# Patient Record
Sex: FEMALE | URBAN - METROPOLITAN AREA
[De-identification: names, ages, dates, MRNs, and addresses within clinical notes are randomized per-mention and may not be internally consistent; named-entity substitution may affect disease eponyms.]

---

## 2024-02-04 ENCOUNTER — LAB (OUTPATIENT)
Dept: URBAN - METROPOLITAN AREA MEDICAL CENTER 19 | Facility: MEDICAL CENTER | Age: 24
End: 2024-02-04
Payer: COMMERCIAL

## 2024-02-04 DIAGNOSIS — R93.3 ABNORMAL FINDINGS ON DIAGNOSTIC IMAGING OF OTHER PARTS OF DIGESTIVE TRACT: ICD-10-CM

## 2024-02-04 DIAGNOSIS — R11.2 NAUSEA AND VOMITING, UNSPECIFIED VOMITING TYPE: ICD-10-CM

## 2024-02-04 DIAGNOSIS — R10.84 GENERALIZED ABDOMINAL PAIN: ICD-10-CM

## 2024-02-04 DIAGNOSIS — D69.0 ALLERGIC PURPURA: ICD-10-CM

## 2024-02-04 PROCEDURE — 99222 1ST HOSP IP/OBS MODERATE 55: CPT | Performed by: INTERNAL MEDICINE

## 2024-02-05 ENCOUNTER — LAB (OUTPATIENT)
Dept: URBAN - METROPOLITAN AREA MEDICAL CENTER 19 | Facility: MEDICAL CENTER | Age: 24
End: 2024-02-05
Payer: COMMERCIAL

## 2024-02-05 DIAGNOSIS — R19.7 DIARRHEA, UNSPECIFIED TYPE: ICD-10-CM

## 2024-02-05 DIAGNOSIS — R93.3 ABNORMAL FINDINGS ON DIAGNOSTIC IMAGING OF OTHER PARTS OF DIGESTIVE TRACT: ICD-10-CM

## 2024-02-05 DIAGNOSIS — D69.0 ALLERGIC PURPURA: ICD-10-CM

## 2024-02-05 DIAGNOSIS — R11.2 NAUSEA AND VOMITING, UNSPECIFIED VOMITING TYPE: ICD-10-CM

## 2024-02-05 DIAGNOSIS — R10.84 GENERALIZED ABDOMINAL PAIN: ICD-10-CM

## 2024-02-05 PROCEDURE — 99232 SBSQ HOSP IP/OBS MODERATE 35: CPT | Performed by: INTERNAL MEDICINE

## 2024-04-05 ENCOUNTER — OV NP (OUTPATIENT)
Dept: URBAN - METROPOLITAN AREA CLINIC 113 | Facility: CLINIC | Age: 24
End: 2024-04-05
Payer: COMMERCIAL

## 2024-04-05 VITALS
RESPIRATION RATE: 16 BRPM | DIASTOLIC BLOOD PRESSURE: 83 MMHG | HEART RATE: 84 BPM | WEIGHT: 119 LBS | TEMPERATURE: 98.1 F | SYSTOLIC BLOOD PRESSURE: 124 MMHG | HEIGHT: 61 IN | BODY MASS INDEX: 22.47 KG/M2

## 2024-04-05 DIAGNOSIS — K52.89 (LYMPHOCYTIC) MICROSCOPIC COLITIS: ICD-10-CM

## 2024-04-05 DIAGNOSIS — D69.0 ALLERGIC PURPURA: ICD-10-CM

## 2024-04-05 DIAGNOSIS — R14.0 BLOATING: ICD-10-CM

## 2024-04-05 PROBLEM — 387778001: Status: ACTIVE | Noted: 2024-04-05

## 2024-04-05 PROBLEM — 870349001: Status: ACTIVE | Noted: 2024-04-05

## 2024-04-05 PROBLEM — 64613007: Status: ACTIVE | Noted: 2024-04-05

## 2024-04-05 PROBLEM — 116289008: Status: ACTIVE | Noted: 2024-04-05

## 2024-04-05 PROCEDURE — 99214 OFFICE O/P EST MOD 30 MIN: CPT | Performed by: INTERNAL MEDICINE

## 2024-04-05 RX ORDER — GABAPENTIN 100 MG/1
1 CAPSULE CAPSULE ORAL ONCE A DAY
Status: ACTIVE | COMMUNITY

## 2024-04-05 RX ORDER — OMEPRAZOLE 20 MG/1
1 CAPSULE 30 MINUTES BEFORE MORNING MEAL CAPSULE, DELAYED RELEASE ORAL ONCE A DAY
Qty: 30 | Refills: 3 | OUTPATIENT
Start: 2024-04-05

## 2024-04-05 RX ORDER — DOXEPIN HYDROCHLORIDE 100 MG/1
1 CAPSULE AT BEDTIME CAPSULE ORAL ONCE A DAY
Status: ACTIVE | COMMUNITY

## 2024-04-05 NOTE — PHYSICAL EXAM GASTROINTESTINAL
Abdomen soft, mild suprapubic tenderness, no distension, no masses palpable , normal bowel sounds, no hepatosplenomegaly

## 2024-04-05 NOTE — HPI-TODAY'S VISIT:
23-year-old woman with history of PCOS and IgA vasculitis presents for posthospital discharge follow-up. . She was admitted to the hospital in early February with abdominal pain, nausea, vomiting approximately 2 weeks following sinus surgery.  Approximately 1 week postop, she developed a purpleish skin rash on her lower extremities followed by arthralgias followed by abdominal pain and diarrhea.  No blood per rectum or melena.  CT scan of the abdomen pelvis with contrast revealed wall thickening and mucosal enhancement in a long segment of terminal ileum with inflammatory changes but no obstruction.  Normal colon.  No family history of inflammatory bowel disease. . She was diagnosed with IgA vasculitis/Henoch-Schoenlein purpura.  No proteinuria or hematuria or acute kidney injury.  C3, C4, rheumatoid factor normal.  Hep B, C, and HIV were negative.  At that time, it was thought that her enteritis was related to vasculitis.  Fecal calprotectin and stool studies were recommended but I do not see that these were completed.   . Since discharge, has seen Dr. Coleman (Rheum) who has maintained her on steroids.  Currently on prednisone 15 mg per day.  when she has tapered to 10 mg, the rash comes back.  Abd symptoms are much better.  Continues to have bloating.  Occasional pain particularly when rash flares.  No vomiting.  Smaller more frequent meals help.  Has BM daily.  Loose, mucousy stools in morning.  Some weight gain which she attributes to steroids.  Continues to have joint pain.

## 2024-06-05 ENCOUNTER — DASHBOARD ENCOUNTERS (OUTPATIENT)
Age: 24
End: 2024-06-05

## 2024-06-05 ENCOUNTER — OFFICE VISIT (OUTPATIENT)
Dept: URBAN - METROPOLITAN AREA CLINIC 113 | Facility: CLINIC | Age: 24
End: 2024-06-05

## 2024-06-05 RX ORDER — GABAPENTIN 100 MG/1
1 CAPSULE CAPSULE ORAL ONCE A DAY
Status: ACTIVE | COMMUNITY

## 2024-06-05 RX ORDER — DOXEPIN HYDROCHLORIDE 100 MG/1
1 CAPSULE AT BEDTIME CAPSULE ORAL ONCE A DAY
Status: ACTIVE | COMMUNITY

## 2024-06-05 RX ORDER — OMEPRAZOLE 20 MG/1
1 CAPSULE 30 MINUTES BEFORE MORNING MEAL CAPSULE, DELAYED RELEASE ORAL ONCE A DAY
Qty: 30 | Refills: 3 | Status: ACTIVE | COMMUNITY
Start: 2024-04-05

## 2024-06-05 NOTE — HPI-TODAY'S VISIT:
23-year-old woman with history of PCOS and IgA vasculitis presents for follow-up.  My initial visit with her was in early April 2024 at which time she was seen as a posthospital discharge follow-up for enteritis thought to be related to IgA vasculitis/HSP.  At that time, she was being maintained on prednisone 15 mg daily and attempts to taper to 10 mg apparently led to recurrence of her purpuric rash.  At that time, she was having minimal GI symptoms with much improvement in her abdominal pain.  Was having bloating and occasional pain which appeared to worsen with flare of her rash.  She was adhering to small frequent meals which was controlling her nausea and preventing vomiting.  She was having daily bowel movements and some weight gain which she attributed to steroids.  Recommendations at that time were to start PPI while on steroids, low FODMAP diet, Citrucel for bowel regularity.  EGD/colonoscopy deferred at that time given improvement in symptoms.

## 2024-08-02 ENCOUNTER — TELEPHONE ENCOUNTER (OUTPATIENT)
Dept: URBAN - METROPOLITAN AREA CLINIC 113 | Facility: CLINIC | Age: 24
End: 2024-08-02

## 2024-08-26 ENCOUNTER — ERX REFILL RESPONSE (OUTPATIENT)
Dept: URBAN - METROPOLITAN AREA CLINIC 113 | Facility: CLINIC | Age: 24
End: 2024-08-26

## 2024-08-26 ENCOUNTER — WEB ENCOUNTER (OUTPATIENT)
Dept: URBAN - METROPOLITAN AREA CLINIC 113 | Facility: CLINIC | Age: 24
End: 2024-08-26

## 2024-08-26 RX ORDER — OMEPRAZOLE 20 MG/1
1 CAPSULE 30 MINUTES BEFORE MORNING MEAL CAPSULE, DELAYED RELEASE ORAL ONCE A DAY
Qty: 30 | Refills: 3 | OUTPATIENT

## 2024-08-26 RX ORDER — OMEPRAZOLE 20 MG/1
TAKE 1 CAPSULE BY MOUTH 30 MINUTES BEFORE MORNING MEAL EVERY DAY FOR 30 DAYS CAPSULE, DELAYED RELEASE ORAL
Qty: 90 CAPSULE | Refills: 1 | OUTPATIENT

## 2024-11-13 ENCOUNTER — OFFICE VISIT (OUTPATIENT)
Dept: URBAN - METROPOLITAN AREA CLINIC 113 | Facility: CLINIC | Age: 24
End: 2024-11-13
Payer: COMMERCIAL

## 2024-11-13 VITALS
RESPIRATION RATE: 16 BRPM | WEIGHT: 124.2 LBS | HEART RATE: 90 BPM | BODY MASS INDEX: 23.45 KG/M2 | DIASTOLIC BLOOD PRESSURE: 76 MMHG | SYSTOLIC BLOOD PRESSURE: 109 MMHG | HEIGHT: 61 IN | TEMPERATURE: 97.7 F

## 2024-11-13 DIAGNOSIS — K52.9 NONINFECTIVE GASTROENTERITIS AND COLITIS, UNSPECIFIED: ICD-10-CM

## 2024-11-13 DIAGNOSIS — R14.0 BLOATING: ICD-10-CM

## 2024-11-13 DIAGNOSIS — D69.0 ALLERGIC PURPURA: ICD-10-CM

## 2024-11-13 PROCEDURE — 99214 OFFICE O/P EST MOD 30 MIN: CPT | Performed by: INTERNAL MEDICINE

## 2024-11-13 RX ORDER — AZATHIOPRINE 50 MG/1
AS DIRECTED TABLET ORAL
Status: ACTIVE | COMMUNITY

## 2024-11-13 RX ORDER — OMEPRAZOLE 20 MG/1
TAKE 1 CAPSULE BY MOUTH 30 MINUTES BEFORE MORNING MEAL EVERY DAY FOR 30 DAYS CAPSULE, DELAYED RELEASE ORAL
Qty: 90 CAPSULE | Refills: 1 | Status: ACTIVE | COMMUNITY

## 2024-11-13 RX ORDER — DOXEPIN HYDROCHLORIDE 100 MG/1
1 CAPSULE AT BEDTIME CAPSULE ORAL ONCE A DAY
Status: ON HOLD | COMMUNITY

## 2024-11-13 RX ORDER — HYDROCODONE BITARTRATE AND ACETAMINOPHEN 5; 325 MG/1; MG/1
1 TABLET AS NEEDED TABLET ORAL
Status: ACTIVE | COMMUNITY

## 2024-11-13 RX ORDER — GABAPENTIN 100 MG/1
1 CAPSULE CAPSULE ORAL ONCE A DAY
Status: ON HOLD | COMMUNITY

## 2024-11-13 NOTE — PHYSICAL EXAM GASTROINTESTINAL
Abdomen soft, no tenderness, no distension, no masses palpable , normal bowel sounds, no hepatosplenomegaly

## 2024-11-13 NOTE — HPI-TODAY'S VISIT:
Lakeshia White is a 24-year-old woman with history of PCOS and IgA vasculitis presents for follow-up. She was previously followed by Dr. Clay but requested a provider stacy.  She was admitted to the hospital in early February with abdominal pain, nausea, vomiting approximately 2 weeks following sinus surgery. Approximately 1 week postop, she developed a purplish skin rash on her lower extremities followed by arthralgias followed by abdominal pain and diarrhea. No blood per rectum or melena. CT scan of the abdomen and pelvis with contrast revealed wall thickening and mucosal enhancement in a long segment of terminal ileum with inflammatory changes but no obstruction. Normal colon. No family history of inflammatory bowel disease. . She was diagnosed with IgA vasculitis/Henoch-Schoenlein purpura. No proteinuria or hematuria or acute kidney injury. C3, C4, rheumatoid factor normal. Hep B, C, and HIV were negative. At that time, it was thought that her enteritis was related to vasculitis.  Fecal calprotectin and stool studies were recommended but I do not see that these were completed.   . Since discharge, she has been seeing Dr. Darrell Coleman (Rheumatology).  Dr. Clay's initial office visit with her was in early April 2024, when she was seen as a posthospital discharge follow-up for enteritis thought to be related to IgA vasculitis/HSP.  At that time, she was being maintained on prednisone 15 mg daily and attempts to taper to 10 mg led to recurrence of her purpuric rash.  At that time, she was having minimal GI symptoms with much improvement in her abdominal pain, with bloating and occasional pain which worsened with flare of her rash.  She was adhering to small frequent meals which was controlling her nausea and preventing vomiting.  She was having daily bowel movements and some weight gain which she attributed to steroids.  Recommendations at that time were to start PPI while on steroids, low FODMAP diet, Citrucel for bowel regularity.  EGD/colonoscopy deferred at that time given improvement in symptoms.  She missed a follow up visit on 6/5/24.    The patient began taking Imuran 2 weeks ago, and she is much better today.  She was having symptoms of fevers, aches and nausea.  Her stools are still somewhat loose, and to have some mucus, but she feels better overall and seems to be improving.  She has no new complaints today. .

## 2024-11-21 ENCOUNTER — WEB ENCOUNTER (OUTPATIENT)
Dept: URBAN - METROPOLITAN AREA CLINIC 113 | Facility: CLINIC | Age: 24
End: 2024-11-21

## 2024-11-21 RX ORDER — OMEPRAZOLE 20 MG/1
TAKE 1 CAPSULE BY MOUTH 30 MINUTES BEFORE MORNING MEAL EVERY DAY FOR 30 DAYS CAPSULE, DELAYED RELEASE ORAL ONCE A DAY
Qty: 90 | Refills: 2

## 2025-01-15 ENCOUNTER — OFFICE VISIT (OUTPATIENT)
Dept: URBAN - METROPOLITAN AREA CLINIC 113 | Facility: CLINIC | Age: 25
End: 2025-01-15
Payer: COMMERCIAL

## 2025-01-15 VITALS
HEIGHT: 61 IN | WEIGHT: 118 LBS | TEMPERATURE: 97.7 F | HEART RATE: 82 BPM | DIASTOLIC BLOOD PRESSURE: 64 MMHG | SYSTOLIC BLOOD PRESSURE: 98 MMHG | BODY MASS INDEX: 22.28 KG/M2

## 2025-01-15 DIAGNOSIS — K52.9 NONINFECTIVE GASTROENTERITIS AND COLITIS, UNSPECIFIED: ICD-10-CM

## 2025-01-15 DIAGNOSIS — R14.0 BLOATING: ICD-10-CM

## 2025-01-15 DIAGNOSIS — D69.0 ALLERGIC PURPURA: ICD-10-CM

## 2025-01-15 PROCEDURE — 99213 OFFICE O/P EST LOW 20 MIN: CPT | Performed by: INTERNAL MEDICINE

## 2025-01-15 RX ORDER — TIZANIDINE 2 MG/1
1 TABLET AT BEDTIME AS NEEDED TABLET ORAL ONCE A DAY
Status: ACTIVE | COMMUNITY

## 2025-01-15 RX ORDER — OMEPRAZOLE 20 MG/1
TAKE 1 CAPSULE BY MOUTH 30 MINUTES BEFORE MORNING MEAL EVERY DAY FOR 30 DAYS CAPSULE, DELAYED RELEASE ORAL ONCE A DAY
Qty: 90 | Refills: 2 | Status: ACTIVE | COMMUNITY

## 2025-01-15 RX ORDER — GABAPENTIN 100 MG/1
1 CAPSULE CAPSULE ORAL ONCE A DAY
Status: ON HOLD | COMMUNITY

## 2025-01-15 RX ORDER — HYDROCODONE BITARTRATE AND ACETAMINOPHEN 5; 325 MG/1; MG/1
1 TABLET AS NEEDED TABLET ORAL
Status: ACTIVE | COMMUNITY

## 2025-01-15 RX ORDER — DOXEPIN HYDROCHLORIDE 100 MG/1
1 CAPSULE AT BEDTIME CAPSULE ORAL ONCE A DAY
Status: ON HOLD | COMMUNITY

## 2025-01-15 RX ORDER — AZATHIOPRINE 50 MG/1
AS DIRECTED TABLET ORAL TWICE DAILY
Status: ACTIVE | COMMUNITY

## 2025-01-15 NOTE — HPI-TODAY'S VISIT:
Lakeshia White is a 24-year-old woman with history of PCOS and IgA vasculitis presents for follow-up. She was previously followed by Dr. Clay but requested a provider vickich. I last saw her on 11/13/14.  She was admitted to the hospital in early February 2024with abdominal pain, nausea, vomiting approximately 2 weeks following sinus surgery. Approximately 1 week postop, she developed a purplish skin rash on her lower extremities followed by arthralgias followed by abdominal pain and diarrhea. No blood per rectum or melena. CT scan of the abdomen and pelvis with contrast revealed wall thickening and mucosal enhancement in a long segment of terminal ileum with inflammatory changes but no obstruction. Normal colon. No family history of inflammatory bowel disease. . She was diagnosed with IgA vasculitis/Henoch-Schoenlein purpura. No proteinuria or hematuria or acute kidney injury. C3, C4, rheumatoid factor normal. Hep B, C, and HIV were negative. At that time, it was thought that her enteritis was related to vasculitis.  Fecal calprotectin and stool studies were recommended but I do not see that these were completed.   . Since discharge, she has been seeing Dr. Darrell Coleman (Rheumatology).  Dr. Clay's initial office visit with her was in early April 2024, when she was seen as a posthospital discharge follow-up for enteritis thought to be related to IgA vasculitis/HSP.  At that time, she was being maintained on prednisone 15 mg daily and attempts to taper to 10 mg led to recurrence of her purpuric rash.  At that time, she was having minimal GI symptoms with much improvement in her abdominal pain, with bloating and occasional pain which worsened with flare of her rash.  She was adhering to small frequent meals which was controlling her nausea and preventing vomiting.  She was having daily bowel movements and some weight gain which she attributed to steroids.  Recommendations at that time were to start PPI while on steroids, low FODMAP diet, Citrucel for bowel regularity.  EGD/colonoscopy deferred at that time given improvement in symptoms.  She missed a follow up visit on 6/5/24.    The patient began taking Imuran 2 in November 2024, and she was much better at her 11/13/24 OV.  She was having symptoms of fevers, aches and nausea.  At her 11/13/24 OV, her stools were still somewhat loose, and had some mucus, but she felt better overall and was improving.  She had no new complaints.    The patient remains on Imuran 100 mg p.o. twice daily.  She did have a slight flare in symptoms recently and her prednisone had to be increased.  She is now on 6 mg of prednisone a day.  Symptoms are improved on this regimen currently.  Reflux symptoms are controlled on omeprazole. .

## 2025-02-01 ENCOUNTER — TELEPHONE ENCOUNTER (OUTPATIENT)
Dept: URBAN - METROPOLITAN AREA CLINIC 113 | Facility: CLINIC | Age: 25
End: 2025-02-01

## 2025-03-03 ENCOUNTER — OFFICE VISIT (OUTPATIENT)
Dept: URBAN - METROPOLITAN AREA CLINIC 113 | Facility: CLINIC | Age: 25
End: 2025-03-03
Payer: COMMERCIAL

## 2025-03-03 ENCOUNTER — LAB OUTSIDE AN ENCOUNTER (OUTPATIENT)
Dept: URBAN - METROPOLITAN AREA CLINIC 113 | Facility: CLINIC | Age: 25
End: 2025-03-03

## 2025-03-03 VITALS
BODY MASS INDEX: 22.09 KG/M2 | DIASTOLIC BLOOD PRESSURE: 64 MMHG | WEIGHT: 117 LBS | TEMPERATURE: 97.8 F | SYSTOLIC BLOOD PRESSURE: 104 MMHG | RESPIRATION RATE: 16 BRPM | HEART RATE: 71 BPM | HEIGHT: 61 IN

## 2025-03-03 DIAGNOSIS — R19.5 LOOSE STOOLS: ICD-10-CM

## 2025-03-03 DIAGNOSIS — R10.33 PERIUMBILICAL ABDOMINAL PAIN: ICD-10-CM

## 2025-03-03 DIAGNOSIS — R11.2 NAUSEA AND VOMITING, UNSPECIFIED VOMITING TYPE: ICD-10-CM

## 2025-03-03 DIAGNOSIS — K62.5 BRIGHT RED BLOOD PER RECTUM: ICD-10-CM

## 2025-03-03 PROBLEM — 443503005: Status: ACTIVE | Noted: 2025-03-03

## 2025-03-03 PROBLEM — 16932000: Status: ACTIVE | Noted: 2025-03-03

## 2025-03-03 PROCEDURE — 99214 OFFICE O/P EST MOD 30 MIN: CPT | Performed by: NURSE PRACTITIONER

## 2025-03-03 RX ORDER — OMEPRAZOLE 20 MG/1
TAKE 1 CAPSULE BY MOUTH 30 MINUTES BEFORE MORNING MEAL EVERY DAY FOR 30 DAYS CAPSULE, DELAYED RELEASE ORAL ONCE A DAY
Qty: 90 | Refills: 2 | Status: ACTIVE | COMMUNITY

## 2025-03-03 RX ORDER — GABAPENTIN 100 MG/1
1 CAPSULE CAPSULE ORAL ONCE A DAY
Status: ON HOLD | COMMUNITY

## 2025-03-03 RX ORDER — AZATHIOPRINE 50 MG/1
AS DIRECTED TABLET ORAL TWICE DAILY
Status: ACTIVE | COMMUNITY

## 2025-03-03 RX ORDER — HYDROCODONE BITARTRATE AND ACETAMINOPHEN 5; 325 MG/1; MG/1
1 TABLET AS NEEDED TABLET ORAL
Status: ACTIVE | COMMUNITY

## 2025-03-03 RX ORDER — TIZANIDINE 2 MG/1
1 TABLET AT BEDTIME AS NEEDED TABLET ORAL ONCE A DAY
Status: ACTIVE | COMMUNITY

## 2025-03-03 RX ORDER — DOXEPIN HYDROCHLORIDE 100 MG/1
1 CAPSULE AT BEDTIME CAPSULE ORAL ONCE A DAY
Status: ON HOLD | COMMUNITY

## 2025-03-03 NOTE — HPI-TODAY'S VISIT:
This is a 24-year-old female with a history of PCOS and IgA vasculitis, bloating, noninfective gastroenteritis, allergic purpura, enteritis presenting for follow-up.  She was last seen 1/15/2025.  She had previously been managed by Dr. Clay.  She was under the care of rheumatology.  She was compliant with the Imuran 100 mg twice a day.  She had a slight flare in symptoms recently and prednisone had to be increased.  She was taking 6 mg of prednisone daily.  Her symptoms have improved on this regimen.  Reflux was controlled with omeprazole.  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  She was better on azathioprine and prednisone.  A message was sent from Dr. Ho 2/1/2025 stating she had been admitted to the ER for severe abdominal pain, nausea, and vomiting.  She was to be scheduled for follow-up.  EGD was a recommendation.  She reports abdominal pain indicated beneath the navel that occasionally radiates into the epigastrium.  She describes it as "stomach ripping up or a burning ball of fire or pulling apart."  Pain is intermittent and is not associated with eating.  However, if she is painful and she eats, the pain worsens.  Occasionally, it feels like cramps.  She reports bowel movements every day.  Occasionally, she has 1 or 2 loose or watery stools.  She has noticed mucus with occasional blood in the mucus with her bowel movements for the last 3 months.  She reports nausea and vomiting occasionally associated with pain.  This occurred most recently a week ago.  She noticed red and mucus in the vomitus.  She is taking omeprazole 20 mg daily and denies heartburn.  She reports mild weight loss.  She denies other abdominal symptoms.  Emergency department records have been requested but have not been received.

## 2025-03-13 ENCOUNTER — OFFICE VISIT (OUTPATIENT)
Dept: URBAN - METROPOLITAN AREA CLINIC 113 | Facility: CLINIC | Age: 25
End: 2025-03-13

## 2025-03-23 ENCOUNTER — LAB OUTSIDE AN ENCOUNTER (OUTPATIENT)
Dept: URBAN - METROPOLITAN AREA CLINIC 113 | Facility: CLINIC | Age: 25
End: 2025-03-23

## 2025-03-23 ENCOUNTER — TELEPHONE ENCOUNTER (OUTPATIENT)
Dept: URBAN - METROPOLITAN AREA CLINIC 113 | Facility: CLINIC | Age: 25
End: 2025-03-23

## 2025-03-23 PROBLEM — 119971000119104: Status: ACTIVE | Noted: 2025-03-23

## 2025-03-23 PROBLEM — 119522002: Status: ACTIVE | Noted: 2025-03-23

## 2025-03-23 PROBLEM — 15634181000119107: Status: ACTIVE | Noted: 2025-03-23

## 2025-05-05 ENCOUNTER — OFFICE VISIT (OUTPATIENT)
Dept: URBAN - METROPOLITAN AREA MEDICAL CENTER 19 | Facility: MEDICAL CENTER | Age: 25
End: 2025-05-05
Payer: COMMERCIAL

## 2025-05-05 DIAGNOSIS — R93.3 ABN FINDINGS-GI TRACT: ICD-10-CM

## 2025-05-05 DIAGNOSIS — R10.84 ABDOMINAL CRAMPING, GENERALIZED: ICD-10-CM

## 2025-05-05 PROCEDURE — 45380 COLONOSCOPY AND BIOPSY: CPT | Performed by: INTERNAL MEDICINE

## 2025-05-06 ENCOUNTER — TELEPHONE ENCOUNTER (OUTPATIENT)
Dept: URBAN - METROPOLITAN AREA CLINIC 113 | Facility: CLINIC | Age: 25
End: 2025-05-06

## 2025-05-08 ENCOUNTER — OFFICE VISIT (OUTPATIENT)
Dept: URBAN - METROPOLITAN AREA MEDICAL CENTER 19 | Facility: MEDICAL CENTER | Age: 25
End: 2025-05-08
Payer: COMMERCIAL

## 2025-05-08 DIAGNOSIS — R10.84 ABDOMINAL CRAMPING, GENERALIZED: ICD-10-CM

## 2025-05-08 DIAGNOSIS — R93.3 ABN FINDINGS-GI TRACT: ICD-10-CM

## 2025-05-08 PROCEDURE — 45380 COLONOSCOPY AND BIOPSY: CPT | Performed by: INTERNAL MEDICINE

## 2025-05-27 ENCOUNTER — OFFICE VISIT (OUTPATIENT)
Dept: URBAN - METROPOLITAN AREA CLINIC 113 | Facility: CLINIC | Age: 25
End: 2025-05-27

## 2025-05-27 RX ORDER — OMEPRAZOLE 20 MG/1
TAKE 1 CAPSULE BY MOUTH 30 MINUTES BEFORE MORNING MEAL EVERY DAY FOR 30 DAYS CAPSULE, DELAYED RELEASE ORAL ONCE A DAY
Qty: 90 | Refills: 2 | Status: ACTIVE | COMMUNITY

## 2025-05-27 RX ORDER — TRAZODONE HYDROCHLORIDE 100 MG/1
1 TABLET AT BEDTIME TABLET ORAL ONCE A DAY
Status: ACTIVE | COMMUNITY
Start: 2025-05-27

## 2025-05-27 RX ORDER — GABAPENTIN 100 MG/1
1 CAPSULE CAPSULE ORAL ONCE A DAY
Status: ON HOLD | COMMUNITY

## 2025-05-27 RX ORDER — DICYCLOMINE HYDROCHLORIDE 10 MG/1
1 TABLET CAPSULE ORAL
Qty: 60 | Refills: 3 | OUTPATIENT
Start: 2025-05-27

## 2025-05-27 RX ORDER — DOXEPIN HYDROCHLORIDE 100 MG/1
1 CAPSULE AT BEDTIME CAPSULE ORAL ONCE A DAY
Status: ON HOLD | COMMUNITY

## 2025-05-27 RX ORDER — AZATHIOPRINE 50 MG/1
AS DIRECTED TABLET ORAL TWICE DAILY
Status: ACTIVE | COMMUNITY

## 2025-05-27 RX ORDER — TIZANIDINE 2 MG/1
1 TABLET AT BEDTIME AS NEEDED TABLET ORAL ONCE A DAY
Status: ON HOLD | COMMUNITY

## 2025-05-27 RX ORDER — HYDROCODONE BITARTRATE AND ACETAMINOPHEN 5; 325 MG/1; MG/1
1 TABLET AS NEEDED TABLET ORAL
Status: ACTIVE | COMMUNITY

## 2025-05-27 NOTE — HPI-TODAY'S VISIT:
This is a 24-year-old female with a history of PCOS and IgA vasculitis, bloating, noninfective gastroenteritis, allergic purpura, enteritis presenting for follow-up.  She was last in the office 3/3/2025.   Assessment/Discussion: 1.	Periumbilical abdominal pain - R10.33 (Primary)    2.	Nausea and vomiting, unspecified vomiting type - R11.2    3.	Loose stools - R19.5    4.	Bright red blood per rectum - K62.5       Abdominal pain and nausea and vomiting. She reports an episode during which she noticed blood in the mucus in her vomitus. She reports worsening abdominal pain on higher doses of Imuran. Since she has decreased the dose, she reports less pain. She was recently evaluated in the [facility] emergency department. Records have been requested. Dr. Ho saw her in the emergency department per report. He recommended follow-up and a likely EGD. Will schedule EGD at her convenience to assess for gastroduodenal pathology. In the interim, recommend she continue omeprazole 20 mg daily. Medication side effect from Imuran may be contributing. She reports less pain since she decreased her dose.  Occasional loose stools/mucoid bowel movements. She reports occasional blood in the mucus. There was some discussion regarding colonoscopy to assess for inflammatory bowel disease. Will request records. She was later scheduled for colonoscopy due to elevated inflammatory markers and CT showing findings consistent with infectous or inflammatory enteritis.  EGD and colonoscopy reports below.   Today's visit: She has been compliant with omeprazole 20 mg daily.  She denies heartburn.  She has occasional abdominal pain indicating the periumbilical area described as a "tight ball."  If she is in pain and she eats, the pain will worsen.  This is not associated with bowel movements.  She continues to have occasional hard or occasional loose stools.  She may have multiple stools in a day.  She frequently notices mucus.  She has not experienced red blood per rectum lately.  She has intermittent nausea without vomiting.  She denies other abdominal symptoms. She continues to endorse worsening nausea and vomiting with an increase in Imuran dose.  When this occurs, she has burning in the epigastrium and is unable to eat.  She has not experienced significant symptoms recently and denies hematemesis. She is working closely with her rheumatologist and her new primary care physician, Dr. Cadena, regarding vasculitis.  Prednisone has been decreased to 3 mg/day.  She continues to take Imuran.  Gabapentin has been added for possible nerve pain and she has also been prescribed hydrocodone which she occasionally needs.

## 2025-05-27 NOTE — HPI-OTHER HISTORIES
Colonoscopy 5/8/2025: BBPS 9, the entire colon normal status post biopsy, normal examined terminal ileum, normal on direct and retroflexion views. Pathology: Random biopsies demonstrated unremarkable colonic mucosa with no evidence of acute or chronic colitis. Negative for dysplasia.  EGD 5/5/2025: Normal esophagus, chronic gastritis status postbiopsy, normal examined duodenum. Pathology: Antral and body type mucosa with chronic inactive gastritis. No evidence of H. pylori, intestinal metaplasia, dysplasia.

## 2025-06-05 ENCOUNTER — TELEPHONE ENCOUNTER (OUTPATIENT)
Dept: URBAN - METROPOLITAN AREA CLINIC 113 | Facility: CLINIC | Age: 25
End: 2025-06-05